# Patient Record
Sex: FEMALE | Race: ASIAN | NOT HISPANIC OR LATINO | Employment: FULL TIME | URBAN - METROPOLITAN AREA
[De-identification: names, ages, dates, MRNs, and addresses within clinical notes are randomized per-mention and may not be internally consistent; named-entity substitution may affect disease eponyms.]

---

## 2024-02-12 ENCOUNTER — OFFICE VISIT (OUTPATIENT)
Dept: URGENT CARE | Facility: URGENT CARE | Age: 49
End: 2024-02-12
Payer: COMMERCIAL

## 2024-02-12 VITALS
TEMPERATURE: 97.4 F | BODY MASS INDEX: 22.18 KG/M2 | HEIGHT: 59 IN | OXYGEN SATURATION: 100 % | RESPIRATION RATE: 15 BRPM | HEART RATE: 55 BPM | SYSTOLIC BLOOD PRESSURE: 136 MMHG | WEIGHT: 110 LBS | DIASTOLIC BLOOD PRESSURE: 81 MMHG

## 2024-02-12 DIAGNOSIS — L03.314 CELLULITIS OF GROIN: Primary | ICD-10-CM

## 2024-02-12 PROCEDURE — 99203 OFFICE O/P NEW LOW 30 MIN: CPT | Performed by: PHYSICIAN ASSISTANT

## 2024-02-12 RX ORDER — CEPHALEXIN 500 MG/1
500 CAPSULE ORAL 3 TIMES DAILY
Qty: 30 CAPSULE | Refills: 0 | Status: SHIPPED | OUTPATIENT
Start: 2024-02-12 | End: 2024-02-22

## 2024-02-12 NOTE — PROGRESS NOTES
Cellulitis of groin  - cephALEXin (KEFLEX) 500 MG capsule; Take 1 capsule (500 mg) by mouth 3 times daily for 10 days       Cellulitis: Care Instructions  Your Care Instructions     Cellulitis is a skin infection caused by bacteria, most often strep or staph. It often occurs after a break in the skin from a scrape, cut, bite, or puncture, or after a rash.  Cellulitis may be treated without doing tests to find out what caused it. But your doctor may do tests, if needed, to look for a specific bacteria, like methicillin-resistant Staphylococcus aureus (MRSA).  The doctor has checked you carefully, but problems can develop later. If you notice any problems or new symptoms, get medical treatment right away.  Follow-up care is a key part of your treatment and safety. Be sure to make and go to all appointments, and call your doctor if you are having problems. It's also a good idea to know your test results and keep a list of the medicines you take.  How can you care for yourself at home?  Take your antibiotics as directed. Do not stop taking them just because you feel better. You need to take the full course of antibiotics.  Prop up the infected area on pillows to reduce pain and swelling. Try to keep the area above the level of your heart as often as you can.  If your doctor told you how to care for your wound, follow your doctor's instructions. If you did not get instructions, follow this general advice:  Wash the wound with clean water 2 times a day. Don't use hydrogen peroxide or alcohol, which can slow healing.  You may cover the wound with a thin layer of petroleum jelly, such as Vaseline, and a nonstick bandage.  Apply more petroleum jelly and replace the bandage as needed.  Be safe with medicines. Take pain medicines exactly as directed.  If the doctor gave you a prescription medicine for pain, take it as prescribed.  If you are not taking a prescription pain medicine, ask your doctor if you can take an  "over-the-counter medicine.  To prevent cellulitis in the future  Try to prevent cuts, scrapes, or other injuries to your skin. Cellulitis most often occurs where there is a break in the skin.  If you get a scrape, cut, mild burn, or bite, wash the wound with clean water as soon as you can to help avoid infection. Don't use hydrogen peroxide or alcohol, which can slow healing.  If you have swelling in your legs (edema), support stockings and good skin care may help prevent leg sores and cellulitis.  Take care of your feet, especially if you have diabetes or other conditions that increase the risk of infection. Wear shoes and socks. Do not go barefoot. If you have athlete's foot or other skin problems on your feet, talk to your doctor about how to treat them.  When should you call for help?   Call your doctor now or seek immediate medical care if:    You have signs that your infection is getting worse, such as:  Increased pain, swelling, warmth, or redness.  Red streaks leading from the area.  Pus draining from the area.  A fever.     You get a rash.   Watch closely for changes in your health, and be sure to contact your doctor if:    You do not get better as expected.   Where can you learn more?  Go to https://www.Cloudstaff.net/patiented  Enter X309 in the search box to learn more about \"Cellulitis: Care Instructions.\"  Current as of: March 21, 2023               Content Version: 13.8    5044-2053 [employer].   Care instructions adapted under license by your healthcare professional. If you have questions about a medical condition or this instruction, always ask your healthcare professional. [employer] disclaims any warranty or liability for your use of this information.           Patient was advised to return to clinic for reevaluation (either UC or PCP) if symptoms do not improve in 7 days. Patient educated on red flag symptoms and asked to go directly to the ED if these symptoms present " themselves.       Eduardo Yousif PA-C  [facility]    Subjective   48 year old who presents to clinic today for the following health issues:    Derm Problem and Urgent Care       HPI     Possible ingrown hair, irritated as of this morning. Patient states that she first noticed it about a week ago and it has grown worse this morning. It is on her left groin. Patient states that it is tender, red, swollen, and warm to touch. Patient states that she has noticed some purulent discharge this morning. Denies any fevers, chills or body aches.      Review of Systems   Review of Systems   See HPI    Objective    Temp: 97.4  F (36.3  C) Temp src: Temporal BP: 136/81 Pulse: 55   Resp: 15 SpO2: 100 %       Physical Exam   Physical Exam  Constitutional:       General: She is not in acute distress.     Appearance: Normal appearance. She is normal weight. She is not ill-appearing, toxic-appearing or diaphoretic.   HENT:      Head: Normocephalic and atraumatic.   Cardiovascular:      Rate and Rhythm: Normal rate.      Pulses: Normal pulses.   Pulmonary:      Effort: Pulmonary effort is normal. No respiratory distress.   Skin:            Comments: Patient has erythema, swelling, tenderness, and increased warmth in the area shown above.    Neurological:      General: No focal deficit present.      Mental Status: She is alert and oriented to person, place, and time. Mental status is at baseline.      Gait: Gait normal.   Psychiatric:         Mood and Affect: Mood normal.         Behavior: Behavior normal.         Thought Content: Thought content normal.         Judgment: Judgment normal.          No results found for this or any previous visit (from the past 24 hour(s)).

## 2024-04-10 ENCOUNTER — OFFICE VISIT (OUTPATIENT)
Dept: FAMILY MEDICINE | Facility: CLINIC | Age: 49
End: 2024-04-10
Payer: COMMERCIAL

## 2024-04-10 VITALS
TEMPERATURE: 97.9 F | HEART RATE: 52 BPM | SYSTOLIC BLOOD PRESSURE: 115 MMHG | HEIGHT: 58 IN | WEIGHT: 111 LBS | RESPIRATION RATE: 12 BRPM | BODY MASS INDEX: 23.3 KG/M2 | DIASTOLIC BLOOD PRESSURE: 77 MMHG | OXYGEN SATURATION: 98 %

## 2024-04-10 DIAGNOSIS — B00.1 RECURRENT COLD SORES: ICD-10-CM

## 2024-04-10 DIAGNOSIS — L20.84 INTRINSIC ECZEMA: ICD-10-CM

## 2024-04-10 DIAGNOSIS — M79.621 PAIN OF RIGHT UPPER ARM: ICD-10-CM

## 2024-04-10 DIAGNOSIS — Z00.00 ROUTINE GENERAL MEDICAL EXAMINATION AT A HEALTH CARE FACILITY: Primary | ICD-10-CM

## 2024-04-10 PROCEDURE — 99386 PREV VISIT NEW AGE 40-64: CPT | Performed by: INTERNAL MEDICINE

## 2024-04-10 RX ORDER — VALACYCLOVIR HYDROCHLORIDE 1 G/1
2000 TABLET, FILM COATED ORAL 2 TIMES DAILY
Qty: 4 TABLET | Refills: 0 | Status: SHIPPED | OUTPATIENT
Start: 2024-04-10 | End: 2024-04-11

## 2024-04-10 RX ORDER — CLOBETASOL PROPIONATE 0.5 MG/G
OINTMENT TOPICAL 2 TIMES DAILY
Qty: 60 G | Refills: 0 | Status: SHIPPED | OUTPATIENT
Start: 2024-04-10

## 2024-04-10 SDOH — HEALTH STABILITY: PHYSICAL HEALTH: ON AVERAGE, HOW MANY DAYS PER WEEK DO YOU ENGAGE IN MODERATE TO STRENUOUS EXERCISE (LIKE A BRISK WALK)?: 4 DAYS

## 2024-04-10 ASSESSMENT — SOCIAL DETERMINANTS OF HEALTH (SDOH): HOW OFTEN DO YOU GET TOGETHER WITH FRIENDS OR RELATIVES?: NEVER

## 2024-04-10 ASSESSMENT — PAIN SCALES - GENERAL: PAINLEVEL: MODERATE PAIN (5)

## 2024-04-10 NOTE — COMMUNITY RESOURCES LIST (ENGLISH)
April 10, 2024           YOUR PERSONALIZED LIST OF SERVICES & PROGRAMS           NAVIGATION    Eligibility Screening      [facility]  [address] [place] [address] (Distance: 4.4 miles)  Phone: [phone]  Email: [email or web address]  Website: https://www.VeraLight.WePopp/individual-health  Language: Hungarian, English, Mauritanian, Armenian, Lebanese, Thai  Fee: Free  Accessibility: Ada accessible, Blind accommodation, Deaf or hard of hearing, Translation services      [facility] - Navigators  Phone: [phone]  Website: https://www.[facility].org/about-us/assister-program/navigators/index.jsp  Language: English  Hours: Mon 8:00 AM - 4:00 PM Tue 8:00 AM - 4:00 PM Wed 8:00 AM - 4:00 PM Thu 8:00 AM - 4:00 PM      [facility] [place] - SNAP (formerly food stamps) Screening and Application help  Phone: [phone] [phone]  Website: https://www.TYSON Security.org/programs/[place]-food-helpline/  Language: English  Hours: Mon 10:00 AM - 5:00 PM Tue 10:00 AM - 5:00 PM Wed 10:00 AM - 5:00 PM Thu 10:00 AM - 5:00 PM Fri 10:00 AM - 5:00 PM  Fee: Free  Accessibility: Ada accessible, Blind accommodation, Deaf or hard of hearing, Translation services        ASSISTANCE    Nutrition Benefits      [facility] - Care Coordination (Healthcare only)  Phone: [phone]  Website: https://Eventstagr.am.org  Language: English, Lao  Hours: Wed 9:00 AM - 11:30 AM Thu 1:00 PM - 4:00 PM, 5:30 PM - 7:00 PM      [employer] [place] [employer] Mobile  Phone: [phone]  Website: https://www.TYSON Security.org/programs/market-Lycera/  Language: English  Hours: Mon 10:00 AM - 5:00 PM Tue 10:00 AM - 5:00 PM Wed 10:00 AM - 5:00 PM Thu 10:00 AM - 5:00 PM Fri 10:00 AM - 5:00 PM  Fee: Self pay      [facility] [place] - SNAP (formerly food stamps) Screening and Application help  Phone: [phone]  Website:  https://www.hungersolutions.org/programs/[place]-food-helpline/  Language: English  Hours: Mon 10:00 AM - 5:00 PM Tue 10:00 AM - 5:00 PM Wed 10:00 AM - 5:00 PM Thu 10:00 AM - 5:00 PM Fri 10:00 AM - 5:00 PM  Fee: Free  Accessibility: Ada accessible, Blind accommodation, Deaf or hard of hearing, Translation services    [facility] - Food Distribution  [address] [place] [address] (Distance: 4.0 miles)  Phone: [phone]  Language: English, Guamanian  Fee: Free  Accessibility: Ada accessible      [facility] - Food Shelf  [address] [place] [address] (Distance: 2.3 miles)  Phone: [phone]  Website: http://www.Bandwidth.VODECLIC  Language: English  Fee: Free  Accessibility: Translation services      [facility]  Phone: [phone]) [phone]  Website: https://www.Mai[employer] Foods/empowerment-food-bank  Language: English  Hours: Mon 9:00 AM - 5:00 PM Tue 9:00 AM - 5:00 PM Wed 9:00 AM - 5:00 PM Thu 9:00 AM - 5:00 PM Fri 9:00 AM - 5:00 PM  Fee: Free        & SHELTER    Case Management      Living - Housing Stabilization Services  [address] [place] [address] (Distance: 9.0 miles)  Phone: [phone]  Website: https://www.Grow the Planet  Language: Uzbek, English, Northern Irish  Fee: Insurance, Self pay      [facility] Housing Stabilization Services (HSS)  Phone: [phone]  Website: https://www.Helix HealthdayHygea Holdings.net/resources  Language: English, Guamanian  Hours: Mon 8:00 AM - 4:00 PM Tue 8:00 AM - 4:00 PM Wed 8:00 AM - 4:00 PM Thu 8:00 AM - 4:00 PM Fri 8:00 AM - 4:00 PM  Fee: Free, Insurance  Accessibility: Ada accessible, Blind accommodation, Deaf or hard of hearing, Translation services      [facility] - Housing Stabilization Services  Phone: [phone]  Website: https://Pot[employer]ly Sand[employer] Works.Nanomech/  Language: English  Hours: Mon 8:00 AM - 4:00 PM Tue 8:00 AM - 4:00 PM Wed  8:00 AM - 4:00 PM Thu 8:00 AM - 4:00 PM Fri 8:00 AM - 4:00 PM  Fee: Free  Accessibility: Blind accommodation, Deaf or hard of hearing  Transportation Options: Free transportation    Payment Assistance      [facility]  [address] [place] [address] (Distance: 2.0 miles)  Phone: [phone]  Website: https://www."BabyJunk[employer]"  Language: English  Fee: Free  Accessibility: Translation services      [employer] - Keep the Key  Phone: (671) [phone]  Website: https://www.cow10-cmodyknwjbguui.org/programs.html  Language: English  Hours: Mon 7:00 AM - 7:00 PM Tue 7:00 AM - 7:00 PM Wed 7:00 AM - 7:00 PM Thu 7:00 AM - 7:00 PM Fri 7:00 AM - 7:00 PM  Fee: Free      - [facility] COUNSELING [facility] COUNSELING  Phone: [phone]  Email: [email or web address]  Website: http://www.nidhousing.com  Language: English    Mediation & Eviction Prevention      Living - Housing Stabilization Services  [address] [place] [address] (Distance: 9.0 miles)  Phone: [phone] [phone]  Website: https://Mimvi  Language: Wolof, English, Malian  Fee: Insurance, Self pay      Line - Tenant Rights / Eviction Prevention  Website: https://homelinemn.org/q-qccc-dr-/  Language: English, Latvian      [facility] - Housing Stabilization Services  Phone: [phone]) 517-4391  Website: https://Periscape/Housing-Stabilization.html  Language: English  Hours: Mon 9:00 AM - 5:00 PM Tue 9:00 AM - 5:00 PM Wed 9:00 AM - 5:00 PM Thu 9:00 AM - 5:00 PM Fri 9:00 AM - 5:00 PM  Fee: Insurance  Accessibility: Deaf or hard of hearing, Translation services               IMPORTANT NUMBERS & WEBSITES        Emergency Services  911  .   [facility]  211 http://211unitedway.org  .   Poison Control  (179) 994-1526 http://mnpoison.org http://wisconsinpoison.org  .     Suicide and Crisis Lifeline  988 http://988[facility] Hospitalline.org  .   [facility] National  Child Abuse Hotline  331.191.3944 http://Childhelphotline.org   .   National Sexual Assault Hotline  (190) 746-2495 (HOPE) http://Rainn.org   .     National Runaway Safeline  (337) 123-4810 (RUNAWAY) http://WolfGIS.Super Ele&Tec  .   Pregnancy & Postpartum Support  Call/text 222-982-5424  [place]: http://ppsupportmn.org  [place]: http://psichapters.com/wi  .   Substance Abuse National Helpline ([facility])  528-733-HELP (7540) http://Findtreatment.gov   .                DISCLAIMER: These resources have been generated via the [employer] Platform. [employer] does not endorse any service providers mentioned in this resource list. [employer] does not guarantee that the services mentioned in this resource list will be available to you or will improve your health or wellness.    [facility]

## 2024-04-10 NOTE — COMMUNITY RESOURCES LIST (ENGLISH)
April 10, 2024           YOUR PERSONALIZED LIST OF SERVICES & PROGRAMS           & SHELTER    Case Management      Living - Housing Stabilization Services  [address] [place] [address] (Distance: 9.0 miles)  Phone: [phone]  Website: https://www.Telematics4u Services  Language: Romanian, English, Kittitian  Fee: Insurance, Self pay      [facility] Housing Stabilization Services (HSS)  Phone: [phone]) [phone]  Website: https://www.Brew Solutions.net/resources  Language: English, Frisian  Hours: Mon 8:00 AM - 4:00 PM Tue 8:00 AM - 4:00 PM Wed 8:00 AM - 4:00 PM Thu 8:00 AM - 4:00 PM Fri 8:00 AM - 4:00 PM  Fee: Free, Insurance  Accessibility: Ada accessible, Blind accommodation, Deaf or hard of hearing, Translation services      [employer] - Housing Stabilization Services  Phone: [phone]) 704-2023  Website: https://homebasemn.com/  Language: English  Hours: Mon 8:00 AM - 4:00 PM Tue 8:00 AM - 4:00 PM Wed 8:00 AM - 4:00 PM Thu 8:00 AM - 4:00 PM Fri 8:00 AM - 4:00 PM  Fee: Free  Accessibility: Blind accommodation, Deaf or hard of hearing  Transportation Options: Free transportation    Payment Assistance      [facility]  [address] [place] [address] (Distance: 2.0 miles)  Phone: [phone]) [phone]  Website: https://www.Gyst  Language: English  Fee: Free  Accessibility: Translation services      - FINANCIAL SERVICES  Phone: (662) 213-5372  Website: http://www.Barkibu      [employer] - Keep the Key  Phone: (810) 796-7729  Website: https://www.jnk22-tegyblsnchwgiu.org/programs.html  Language: English  Hours: Mon 7:00 AM - 7:00 PM Tue 7:00 AM - 7:00 PM Wed 7:00 AM - 7:00 PM Thu 7:00 AM - 7:00 PM Fri 7:00 AM - 7:00 PM  Fee: Free    Mediation & Eviction Prevention      Living - Housing Stabilization Services  [address] [place] [address] (Distance: 9.0 miles)  Phone: [phone]) [phone]  Website:  https://www.Shepherd Intelligent Systems  Language: Armenian, English, Slovak  Fee: Insurance, Self pay      Line - Tenant Rights / Eviction Prevention  Website: https://[facility].org/r-eqkt-qt-/  Language: English, Italian      [facility] - Housing Stabilization Services  Phone: (533) 276-7940  Website: https://Wynlink/Housing-Stabilization.html  Language: English  Hours: Mon 9:00 AM - 5:00 PM Tue 9:00 AM - 5:00 PM Wed 9:00 AM - 5:00 PM Thu 9:00 AM - 5:00 PM Fri 9:00 AM - 5:00 PM  Fee: Insurance  Accessibility: Deaf or hard of hearing, Translation services            Bill Payment Assistance      [facility] payment assistance - [address] [address] (Distance: 4.0 miles)  Phone: [phone]) 283-1305  Language: English, Italian  Fee: Free, Self pay  Accessibility: Ada accessible      [facility] - Energized  Phone: (395) 564-8199  Website: https://www.ajn55-lukgwkxferpstd.org/programs.html  Language: English  Hours: Mon 7:00 AM - 7:00 PM Tue 7:00 AM - 7:00 PM Wed 7:00 AM - 7:00 PM Thu 7:00 AM - 7:00 PM Fri 7:00 AM - 7:00 PM  Fee: Free      - Dislocated Worker/Adult [employer] Employment Program  Phone: (864) 572-7116  Email: lynda@LocalSortmn.org  Website: https://LocalSortmn.org/services/employment-services/dislocated-worker-program/  Language: English, Slovak  Hours: Mon 8:00 AM - 4:30 PM Tue 8:00 AM - 4:30 PM Wed 8:00 AM - 4:30 PM Thu 8:00 AM - 4:30 PM Fri 8:00 AM - 4:30 PM  Fee: Free  Accessibility: Ada accessible               IMPORTANT NUMBERS & WEBSITES        Emergency Services  911  .   [facility]  211 http://211unitedway.org  .   Poison Control  (711) 259-8396 http://mnpoison.org http://wisconsinpoison.org  .     Suicide and Crisis Lifeline  988 http://988[facility] Hospitalline.org  .   Childhelp National Child Abuse Hotline  713.814.9232 http://Childhelphotline.org   .   National Sexual Assault Hotline  (562)  619-2179 (HOPE) http://Rainn.org   .     National Runaway Safeline  (115) 145-8756 (RUNAWAY) http://Zygo CorporationruFishbowl.Gun.io  .   Pregnancy & Postpartum Support  Call/text 734-933-5723  [place]: http://ppsupportmn.org  [place]: http://psichapters.com/wi  .   Substance Abuse National Helpline ([facility])  524-150-HELP (6868) http://Findtreatment.gov   .                DISCLAIMER: These resources have been generated via the [employer] Platform. [employer] does not endorse any service providers mentioned in this resource list. [employer] does not guarantee that the services mentioned in this resource list will be available to you or will improve your health or wellness.    [facility]

## 2024-04-10 NOTE — PATIENT INSTRUCTIONS
Preventive Care Advice   This is general advice given by our system to help you stay healthy. However, your care team may have specific advice just for you. Please talk to your care team about your preventive care needs.  Nutrition  Eat 5 or more servings of fruits and vegetables each day.  Try wheat bread, brown rice and whole grain pasta (instead of white bread, rice, and pasta).  Get enough calcium and vitamin D. Check the label on foods and aim for 100% of the RDA (recommended daily allowance).  Lifestyle  Exercise at least 150 minutes each week   (30 minutes a day, 5 days a week).  Do muscle strengthening activities 2 days a week. These help control your weight and prevent disease.  No smoking.  Wear sunscreen to prevent skin cancer.  Have a dental exam and cleaning every 6 months.  Yearly exams  See your health care team every year to talk about:  Any changes in your health.  Any medicines your care team has prescribed.  Preventive care, family planning, and ways to prevent chronic diseases.  Shots (vaccines)   HPV shots (up to age 26), if you've never had them before.  Hepatitis B shots (up to age 59), if you've never had them before.  COVID-19 shot: Get this shot when it's due.  Flu shot: Get a flu shot every year.  Tetanus shot: Get a tetanus shot every 10 years.  Pneumococcal, hepatitis A, and RSV shots: Ask your care team if you need these based on your risk.  Shingles shot (for age 50 and up).  General health tests  Diabetes screening:  Starting at age 35, Get screened for diabetes at least every 3 years.  If you are younger than age 35, ask your care team if you should be screened for diabetes.  Cholesterol test: At age 39, start having a cholesterol test every 5 years, or more often if advised.  Bone density scan (DEXA): At age 50, ask your care team if you should have this scan for osteoporosis (brittle bones).  Hepatitis C: Get tested at least once in your life.  STIs (sexually transmitted  infections)  Before age 24: Ask your care team if you should be screened for STIs.  After age 24: Get screened for STIs if you're at risk. You are at risk for STIs (including HIV) if:  You are sexually active with more than one person.  You don't use condoms every time.  You or a partner was diagnosed with a sexually transmitted infection.  If you are at risk for HIV, ask about PrEP medicine to prevent HIV.  Get tested for HIV at least once in your life, whether you are at risk for HIV or not.  Cancer screening tests  Cervical cancer screening: If you have a cervix, begin getting regular cervical cancer screening tests at age 21. Most people who have regular screenings with normal results can stop after age 65. Talk about this with your provider.  Breast cancer scan (mammogram): If you've ever had breasts, begin having regular mammograms starting at age 40. This is a scan to check for breast cancer.  Colon cancer screening: It is important to start screening for colon cancer at age 45.  Have a colonoscopy test every 10 years (or more often if you're at risk) Or, ask your provider about stool tests like a FIT test every year or Cologuard test every 3 years.  To learn more about your testing options, visit: https://www.Maps InDeed/984842.pdf.  For help making a decision, visit: https://bit.ly/as79768.  Prostate cancer screening test: If you have a prostate and are age 55 to 69, ask your provider if you would benefit from a yearly prostate cancer screening test.  Lung cancer screening: If you are a current or former smoker age 50 to 80, ask your care team if ongoing lung cancer screenings are right for you.  For informational purposes only. Not to replace the advice of your health care provider. Copyright   2023 [employer]oke[employer]. All rights reserved. Clinically reviewed by the [facility] Transitions Program. [employer] [id number] - REV 01/24.

## 2024-04-10 NOTE — PROGRESS NOTES
Preventive Care Visit  [facility]  Brayan Sosa MD PhD, Internal Medicine - Pediatrics  Apr 10, 2024  {Provider  Link to SmartSet :211280}    Assessment & Plan     {Diag Picklist:914467}    {Patient advised of split billing (Optional):952035}  {[facility] 2021 Documentation (Optional):579143}  {2021 E&M time (Optional):583937}      Counseling  Appropriate preventive services were discussed with this patient, including applicable screening as appropriate for fall prevention, nutrition, physical activity, Tobacco-use cessation, weight loss and cognition.  Checklist reviewing preventive services available has been given to the patient.      {FOLLOW UP PLANS (Optional) Includes COVID19 Treatment Plan:299397}    Subjective   Damari is a 48 year old, presenting for the following:  Establish Care and Musculoskeletal Problem (Right arm numbness, needs paperwork for school signed//No know injury and ongoing for about 1 year.)        4/10/2024     8:19 AM   Additional Questions   Roomed by Valery HA   Accompanied by self   Failed to redirect to the Timeline version of the [employer] SmartLink.     Health Care Directive  Patient does not have a Health Care Directive or Living Will: Discussed advance care planning with patient; however, patient declined at this time.    History of Present Illness       Reason for visit:  Right arm pain & nursing paper  Symptom onset:  More than a month  Symptoms include:  Pain  Symptom intensity:  Moderate  Symptom progression:  Staying the same  Had these symptoms before:  No  What makes it worse:  Lifting  What makes it better:  Not lifting    She eats 0-1 servings of fruits and vegetables daily.She consumes 0 sweetened beverage(s) daily.She exercises with enough effort to increase her heart rate 30 to 60 minutes per day.  She exercises with enough effort to increase her heart rate 4 days per week.   She is taking medications regularly.    ***  {MA/LPN/RN Pre-Provider Visit Orders-  hCG/UA/Strep (Optional):060053}  {SUPERLIST (Optional):574867}  {additonal problems for provider to add (Optional):647759}       No data to display                   No data to display                   No data to display                  4/9/2024   Social Factors   Worry food won't last until get money to buy more No   Food not last or not have enough money for food? Yes   Do you have housing?  Yes   Are you worried about losing your housing? Yes   Lack of transportation? No   Unable to get utilities (heat,electricity)? No   Want help with housing or utility concern? (!) YES   (!) FOOD SECURITY CONCERN PRESENT(!) HOUSING CONCERN PRESENT       No data to display                     Today's PHQ-2 Score:       4/9/2024    11:32 PM   PHQ-2 ( 1999 Pfizer)   Q1: Little interest or pleasure in doing things 1   Q2: Feeling down, depressed or hopeless 1   PHQ-2 Score 2   Q1: Little interest or pleasure in doing things Several days   Q2: Feeling down, depressed or hopeless Several days   PHQ-2 Score 2            No data to display              Social History     Tobacco Use    Smoking status: Never    Smokeless tobacco: Never     {Provider  If there are gaps in the social history shown above, please follow the link to update and then refresh the note Link to Social and Substance History :899626}     {Mammogram Decision Support (Optional):490346}      History of abnormal Pap smear: { :832553}       ASCVD Risk   The ASCVD Risk score (Neil MCGEE, et al., 2019) failed to calculate for the following reasons:    Cannot find a previous HDL lab    Cannot find a previous total cholesterol lab         No data to display              {Provider  Use the storyboard to review patient history, after sections have been marked as reviewed, refresh note to capture documentation:443628}   Reviewed and updated as needed this visit by Provider                    {HISTORY OPTIONS (Optional):100122}    {ROS Picklists (Optional):707403}    "  Objective    Exam  /77 (BP Location: Right arm, Patient Position: Sitting, Cuff Size: Adult Regular)   Pulse 52   Temp 97.9  F (36.6  C) (Oral)   Resp 12   Ht 1.461 m (4' 9.5\")   Wt 50.3 kg (111 lb)   LMP  (LMP Unknown)   SpO2 98%   BMI 23.60 kg/m     Estimated body mass index is 23.6 kg/m  as calculated from the following:    Height as of this encounter: 1.461 m (4' 9.5\").    Weight as of this encounter: 50.3 kg (111 lb).    Physical Exam  {Exam Choices (Optional):326198}        Signed Electronically by: Brayan Sosa MD PhD  {Email feedback regarding this note to [email or web address]   :802739}  "

## 2024-04-10 NOTE — PROGRESS NOTES
"  {PROVIDER CHARTING PREFERENCE:130118}    Subjective   Damari is a 48 year old, presenting for the following health issues:  Establish Care and Musculoskeletal Problem (Right arm numbness, needs paperwork for school signed//No know injury and ongoing for about 1 year.)      4/10/2024     8:19 AM   Additional Questions   Roomed by Valery HA   Accompanied by self   Failed to redirect to the Timeline version of the [employer] SmartLink.  Musculoskeletal Problem    History of Present Illness       Reason for visit:  Right arm pain & nursing paper  Symptom onset:  More than a month  Symptoms include:  Pain  Symptom intensity:  Moderate  Symptom progression:  Staying the same  Had these symptoms before:  No  What makes it worse:  Lifting  What makes it better:  Not lifting    She eats 0-1 servings of fruits and vegetables daily.She consumes 0 sweetened beverage(s) daily.She exercises with enough effort to increase her heart rate 30 to 60 minutes per day.  She exercises with enough effort to increase her heart rate 4 days per week.   She is taking medications regularly.       {MA/LPN/RN Pre-Provider Visit Orders- hCG/UA/Strep (Optional):491489}  {SUPERLIST (Optional):913480}  {additonal problems for provider to add (Optional):933525}    {ROS Picklists (Optional):034935}      Objective    /77 (BP Location: Right arm, Patient Position: Sitting, Cuff Size: Adult Regular)   Pulse 52   Temp 97.9  F (36.6  C) (Oral)   Resp 12   Ht 1.461 m (4' 9.5\")   Wt 50.3 kg (111 lb)   LMP  (LMP Unknown)   SpO2 98%   BMI 23.60 kg/m    Body mass index is 23.6 kg/m .  Physical Exam   {Exam List (Optional):923075}    {Diagnostic Test Results (Optional):630187}        Signed Electronically by: Brayan Sosa MD PhD  {Email feedback regarding this note to [email or web address]   :502952}  "

## 2024-04-10 NOTE — COMMUNITY RESOURCES LIST (ENGLISH)
April 10, 2024           YOUR PERSONALIZED LIST OF SERVICES & PROGRAMS           NAVIGATION    Eligibility Screening      [facility] Food HelpLine  Phone: [phone]) 574-3610  Website: https://www.WebRadar.org/find-help/  Language: English, Nicaraguan  Hours: Mon 10:00 AM - 5:00 PM Tue 10:00 AM - 5:00 PM Wed 10:00 AM - 5:00 PM Thu 10:00 AM - 5:00 PM Fri 10:00 AM - 5:00 PM  Fee: Free  Accessibility: Ada accessible, Blind accommodation, Deaf or hard of hearing, Translation services      [facility] [place] - SNAP (formerly food stamps) Screening and Application help  Phone: [phone] [phone]  Website: https://www.WebRadar.org/programs/[place]-food-helpline/  Language: English  Hours: Mon 10:00 AM - 5:00 PM Tue 10:00 AM - 5:00 PM Wed 10:00 AM - 5:00 PM Thu 10:00 AM - 5:00 PM Fri 10:00 AM - 5:00 PM  Fee: Free  Accessibility: [place] accessible, Blind accommodation, Deaf or hard of hearing, Translation services      [employer] - [employer]igators  Phone: (107) 811-4934  Website: https://www.[facility].org/about-us/assister-program/navigators/index.jsp  Language: English  Hours: Mon 8:00 AM - 4:00 PM Tue 8:00 AM - 4:00 PM Wed 8:00 AM - 4:00 PM Thu 8:00 AM - 4:00 PM        ASSISTANCE    Nutrition Benefits      [facility] Food HelpLine  Phone: [phone]) 179-6251  Website: https://www.ICONICFoodtoeat.org/find-help/  Language: English, Nicaraguan  Hours: Mon 10:00 AM - 5:00 PM Tue 10:00 AM - 5:00 PM Wed 10:00 AM - 5:00 PM Thu 10:00 AM - 5:00 PM Fri 10:00 AM - 5:00 PM  Fee: Free  Accessibility: Ada accessible, Blind accommodation, Deaf or hard of hearing, Translation services      [employer] [place] [employer]  Phone: [phone]  Website: https://www.WebRadar.org/programs/market-bucks/  Language: English  Hours: Mon 10:00 AM - 5:00 PM Tue 10:00 AM - 5:00 PM Wed 10:00 AM - 5:00 PM Thu 10:00 AM - 5:00 PM Fri 10:00 AM - 5:00 PM  Fee: Self pay      [facility] [place] - SNAP  (formerly food stamps) Screening and Application help  Phone: [phone] [phone]  Website: https://www.FrameBlast.org/programs/[place]-food-helpline/  Language: English  Hours: Mon 10:00 AM - 5:00 PM Tue 10:00 AM - 5:00 PM Wed 10:00 AM - 5:00 PM Thu 10:00 AM - 5:00 PM Fri 10:00 AM - 5:00 PM  Fee: Free  Accessibility: [place] accessible, Blind accommodation, Deaf or hard of hearing, Translation services    [facility] Emergency Assistance Response (NEAR) - Food Shelf  [address] [place] [address] (Distance: 2.3 miles)  Phone: [phone]) [phone]  Website: http://www.Zipano.Vinveli  Language: English  Fee: Free  Accessibility: Translation services      [employer] [place] - Food Shelf   Phone: (866) 683-4484  Website: https://www.FrameBlast.org/find-help/  Language: English  Hours: Mon 10:00 AM - 5:00 PM Tue 10:00 AM - 5:00 PM Wed 10:00 AM - 5:00 PM Thu 10:00 AM - 5:00 PM Fri 10:00 AM - 5:00 PM  Fee: Free  Accessibility: Ada accessible, Blind accommodation, Deaf or hard of hearing, Translation services      [employer] [employer]  Phone: (942) 724-3302  Website: https://www.FuturaMedia.Vinveli/empowerment-food-bank  Language: English  Hours: Mon 9:00 AM - 5:00 PM Tue 9:00 AM - 5:00 PM Wed 9:00 AM - 5:00 PM Thu 9:00 AM - 5:00 PM Fri 9:00 AM - 5:00 PM  Fee: Free        & SHELTER    Case Management      - Online housing search assistance  [address] [place] [address] (Distance: 7.7 miles)  Phone: [phone]) 271-1822  Website: http://www.Etherpadlink.org/  Language: English, Macedonian, Barbadian, Hmong  Accessibility: Ada accessible      Housing Online - https://[employer]/  [address] [address] (Distance: 8.5 miles)  Website: https://[employer] Technologies  Language: English      [employer] - Housing Stabilization Services  Phone: (151) 258-9423  Website: https://homebasemn.com/  Language: English  Hours: Mon  8:00 AM - 4:00 PM Tue 8:00 AM - 4:00 PM Wed 8:00 AM - 4:00 PM Thu 8:00 AM - 4:00 PM Fri 8:00 AM - 4:00 PM  Fee: Free  Accessibility: Blind accommodation, Deaf or hard of hearing  Transportation Options: Free transportation    Payment Assistance      [place] - Housing Stability  [address] [place] [address] (Distance: 2.0 miles)  Phone: (357) 501-4419  Website: https://www.Plyfe  Language: English  Fee: Free  Accessibility: Translation services      [employer] - [employer]  Phone: (636) 982-8056  Website: https://www.skw08-itiwwamztmeptw.org/programs.html  Language: English  Hours: Mon 7:00 AM - 7:00 PM Tue 7:00 AM - 7:00 PM Wed 7:00 AM - 7:00 PM Thu 7:00 AM - 7:00 PM Fri 7:00 AM - 7:00 PM  Fee: Free      - \[facility]\"" COUNSELING [facility]ING COUNSELING  Phone: (924) 738-5088  Email: brenden@[employer]  Website: http://www.nidhousing.com  Language: English    Mediation & Eviction Prevention      Living - Housing Stabilization Services  [address] [place] [address] (Distance: 9.0 miles)  Phone: (662) 543-5708  Website: https://www.[employer]  Language: Mohawk, English, Belarusian  Fee: Insurance, Self pay      Line - Tenant Rights / Eviction Prevention  Website: https://Danvillelinemn.org/b-tzyg-en-/  Language: English, Czech      [employer] - Housing Stabilization Services  Phone: (443) 555-2508  Website: https://Trutap/Housing-Stabilization.html  Language: English  Hours: Mon 9:00 AM - 5:00 PM Tue 9:00 AM - 5:00 PM Wed 9:00 AM - 5:00 PM Thu 9:00 AM - 5:00 PM Fri 9:00 AM - 5:00 PM  Fee: Insurance  Accessibility: Deaf or hard of hearing, Translation services               IMPORTANT NUMBERS & WEBSITES        Emergency Services  911  .   [facility]  211 http://211[facility].org  .   Poison Control  (781) 892-3444 http://mnpoison.org http://wisconsinpoison.org  .     Suicide and Crisis Lifeline  811  http://988lifeline.org  .   Childhelp [place] Child Abuse Hotline  404.973.2383 http://Childhelphotline.org   .   National Sexual Assault Hotline  (479) 997-1301 (HOPE) http://Rainn.org   .     National Runaway Safeline  (354) 861-8296 (RUNAWAY) http://[place] News Network.Sosh  .   Pregnancy & Postpartum Support  Call/text 484-458-9963  [place]: http://ppsupportmn.org  [place]: http://psichapters.com/wi  .   Substance Abuse National Helpline ([facility])  374-650-HELP (2865) http://Findtreatment.gov   .                DISCLAIMER: These resources have been generated via the easyfolio Platform. easyfolio does not endorse any service providers mentioned in this resource list. easyfolio does not guarantee that the services mentioned in this resource list will be available to you or will improve your health or wellness.    [facility]